# Patient Record
Sex: FEMALE | Race: WHITE | NOT HISPANIC OR LATINO | Employment: OTHER | URBAN - METROPOLITAN AREA
[De-identification: names, ages, dates, MRNs, and addresses within clinical notes are randomized per-mention and may not be internally consistent; named-entity substitution may affect disease eponyms.]

---

## 2018-02-23 ENCOUNTER — HOSPITAL ENCOUNTER (OUTPATIENT)
Dept: LAB | Facility: HOSPITAL | Age: 58
Discharge: HOME | End: 2018-02-23
Attending: INTERNAL MEDICINE | Admitting: INTERNAL MEDICINE

## 2018-02-23 ENCOUNTER — HOSPITAL ENCOUNTER (OUTPATIENT)
Dept: RHEUMATOLOGY | Facility: CLINIC | Age: 58
Discharge: HOME | End: 2018-02-23
Attending: INTERNAL MEDICINE | Admitting: INTERNAL MEDICINE

## 2018-02-23 LAB
ALBUMIN SERPL-MCNC: 3.7 G/DL (ref 3.5–4.8)
ALBUMIN SERPL-MCNC: 3.7 G/DL (ref 3.5–4.8)
ALBUMIN/GLOB SERPL: 1.5 {RATIO} (ref 1–1.7)
ALP SERPL-CCNC: 65 IU/L (ref 32–91)
ALPHA1 GLOB FLD ELPH-MCNC: 0.2 GM/DL (ref 0.1–0.4)
ALPHA2 GLOB SERPL ELPH-MCNC: 0.6 GM/DL (ref 0.5–1)
ALT SERPL-CCNC: 16 IU/L (ref 14–54)
ANION GAP SERPL CALC-SCNC: 10.9 MMOL/L (ref 10–20)
AST SERPL-CCNC: 21 IU/L (ref 15–41)
B-GLOBULIN SERPL ELPH-MCNC: 1.1 GM/DL (ref 0.7–1.4)
BASOPHILS # BLD AUTO: 0.2 10*3/UL (ref 0–0.2)
BASOPHILS NFR BLD AUTO: 2 % (ref 0–2)
BILIRUB SERPL-MCNC: 0.2 MG/DL (ref 0.3–1.2)
BILIRUB UR QL STRIP: NEGATIVE MG/DL
BUN SERPL-MCNC: 8 MG/DL (ref 8–20)
BUN/CREAT SERPL: 11.4 (ref 5.4–26.2)
CALCIUM SERPL-MCNC: 9.2 MG/DL (ref 8.9–10.3)
CASTS URNS QL MICRO: NORMAL /[LPF]
CCP IGG ANTIBODIES: <0.4 U/ML
CHLORIDE SERPL-SCNC: 104 MMOL/L (ref 101–111)
CK SERPL-CCNC: 39 IU/L (ref 38–234)
COLOR UR: YELLOW
CONV BACTERIA IN URINE MICRO: NEGATIVE
CONV CLARITY OF URINE: CLEAR
CONV CO2: 28 MMOL/L (ref 22–32)
CONV HYALINE CASTS IN URINE MICRO: NORMAL /[LPF] (ref 0–5)
CONV PROTEIN IN URINE BY AUTOMATED TEST STRIP: NEGATIVE MG/DL
CONV SMALL ROUND CELLS: NORMAL /[HPF]
CONV TOTAL PROTEIN: 6.2 G/DL (ref 6.1–7.9)
CONV TOTAL PROTEIN: 6.4 G/DL (ref 6.1–7.9)
CONV UROBILINOGEN IN URINE BY AUTOMATED TEST STRIP: 0.2 MG/DL
CREAT UR-MCNC: 0.7 MG/DL (ref 0.4–1)
CRP SERPL-MCNC: 0.19 MG/DL (ref 0–0.7)
CULTURE INDICATED?: NORMAL
DIFFERENTIAL METHOD BLD: (no result)
EOSINOPHIL # BLD AUTO: 0.4 10*3/UL (ref 0–0.3)
EOSINOPHIL # BLD AUTO: 4 % (ref 0–3)
ERYTHROCYTE [DISTWIDTH] IN BLOOD BY AUTOMATED COUNT: 13.3 % (ref 11.5–14.5)
ERYTHROCYTE [SEDIMENTATION RATE] IN BLOOD BY WESTERGREN METHOD: 0 MM/HR (ref 0–30)
GAMMA GLOB SERPL ELPH-MCNC: 0.7 GM/DL (ref 0.6–1.6)
GLOBULIN UR ELPH-MCNC: 2.5 G/DL (ref 2.5–3.8)
GLUCOSE SERPL-MCNC: 100 MG/DL (ref 65–99)
GLUCOSE UR QL: NEGATIVE MG/DL
HAV IGM SERPL QL IA: NONREACTIVE
HBV CORE IGM SERPL QL IA: NONREACTIVE
HBV SURFACE AG SERPL QL IA: NONREACTIVE
HCT VFR BLD AUTO: 43 % (ref 35–49)
HCV AB SER DONR QL: NORMAL
HCV AB SER DONR QL: NORMAL
HGB BLD-MCNC: 14.6 G/DL (ref 12–15)
HGB UR QL STRIP: NEGATIVE
INSULIN SERPL-ACNC: NORMAL U[IU]/ML
KETONES UR QL STRIP: NEGATIVE MG/DL
LEUKOCYTE ESTERASE UR QL STRIP: NEGATIVE
LYMPHOCYTES # BLD AUTO: 2.9 10*3/UL (ref 0.8–4.8)
LYMPHOCYTES NFR BLD AUTO: 30 % (ref 18–42)
MCH RBC QN AUTO: 30.3 PG (ref 26–32)
MCHC RBC AUTO-ENTMCNC: 34 G/DL (ref 32–36)
MCV RBC AUTO: 89.2 FL (ref 80–94)
MONOCYTES # BLD AUTO: 0.6 10*3/UL (ref 0.1–1.3)
MONOCYTES NFR BLD AUTO: 6 % (ref 2–11)
NEUTROPHILS # BLD AUTO: 5.6 10*3/UL (ref 2.3–8.6)
NEUTROPHILS NFR BLD AUTO: 58 % (ref 50–75)
NITRITE UR QL STRIP: NEGATIVE
NRBC BLD AUTO-RTO: 0 /100{WBCS}
NRBC/RBC NFR BLD MANUAL: 0 10*3/UL
PH UR STRIP.AUTO: 5 [PH] (ref 4.5–8)
PLATELET # BLD AUTO: 218 10*3/UL (ref 150–450)
PMV BLD AUTO: 10.5 FL (ref 7.4–10.4)
POTASSIUM SERPL-SCNC: 3.9 MMOL/L (ref 3.6–5.1)
RBC # BLD AUTO: 4.81 10*6/UL (ref 4–5.4)
RBC #/AREA URNS HPF: 2 /[HPF] (ref 0–3)
SODIUM SERPL-SCNC: 139 MMOL/L (ref 136–144)
SP GR UR: 1.01 (ref 1–1.03)
SPERM URNS QL MICRO: NORMAL /[HPF]
SQUAMOUS SPT QL MICRO: 2 /[HPF] (ref 0–5)
UNIDENT CRYS URNS QL MICRO: NORMAL /[HPF]
WBC # BLD AUTO: 9.7 10*3/UL (ref 4.5–11.5)
WBC #/AREA URNS HPF: 1 /[HPF] (ref 0–5)
YEAST SPEC QL WET PREP: NORMAL /[HPF]

## 2018-02-26 LAB
ANA SER QL IA: NORMAL

## 2018-03-01 ENCOUNTER — HOSPITAL ENCOUNTER (OUTPATIENT)
Dept: LAB | Facility: HOSPITAL | Age: 58
Discharge: HOME | End: 2018-03-01
Attending: INTERNAL MEDICINE | Admitting: INTERNAL MEDICINE

## 2018-03-06 LAB — TSPOT INTERPRETATION: NORMAL

## 2018-05-01 ENCOUNTER — HOSPITAL ENCOUNTER (OUTPATIENT)
Dept: MAMMOGRAPHY | Facility: HOSPITAL | Age: 58
Discharge: HOME | End: 2018-05-01
Attending: INTERNAL MEDICINE | Admitting: INTERNAL MEDICINE

## 2018-06-20 ENCOUNTER — HOSPITAL ENCOUNTER (OUTPATIENT)
Dept: GENERAL RADIOLOGY | Facility: HOSPITAL | Age: 58
Discharge: HOME | End: 2018-06-20
Attending: NURSE PRACTITIONER | Admitting: NURSE PRACTITIONER

## 2018-06-20 ENCOUNTER — HOSPITAL ENCOUNTER (OUTPATIENT)
Dept: LAB | Facility: HOSPITAL | Age: 58
Discharge: HOME | End: 2018-06-20
Attending: NURSE PRACTITIONER | Admitting: NURSE PRACTITIONER

## 2018-06-20 LAB
ALBUMIN SERPL-MCNC: 3.7 G/DL (ref 3.5–4.8)
ALBUMIN/GLOB SERPL: 1.4 {RATIO} (ref 1–1.7)
ALP SERPL-CCNC: 78 IU/L (ref 32–91)
ALT SERPL-CCNC: 20 IU/L (ref 14–54)
ANION GAP SERPL CALC-SCNC: 10.1 MMOL/L (ref 10–20)
AST SERPL-CCNC: 25 IU/L (ref 15–41)
BASOPHILS # BLD AUTO: 0.1 10*3/UL (ref 0–0.2)
BASOPHILS NFR BLD AUTO: 1 % (ref 0–2)
BILIRUB SERPL-MCNC: 0.7 MG/DL (ref 0.3–1.2)
BUN SERPL-MCNC: 4 MG/DL (ref 8–20)
BUN/CREAT SERPL: 5.7 (ref 5.4–26.2)
CALCIUM SERPL-MCNC: 8.8 MG/DL (ref 8.9–10.3)
CHLORIDE SERPL-SCNC: 105 MMOL/L (ref 101–111)
CONV CO2: 27 MMOL/L (ref 22–32)
CONV TOTAL PROTEIN: 6.4 G/DL (ref 6.1–7.9)
CREAT UR-MCNC: 0.7 MG/DL (ref 0.4–1)
CRP SERPL-MCNC: 0.27 MG/DL (ref 0–0.7)
DIFFERENTIAL METHOD BLD: (no result)
EOSINOPHIL # BLD AUTO: 0.5 10*3/UL (ref 0–0.3)
EOSINOPHIL # BLD AUTO: 4 % (ref 0–3)
ERYTHROCYTE [DISTWIDTH] IN BLOOD BY AUTOMATED COUNT: 13.6 % (ref 11.5–14.5)
ERYTHROCYTE [SEDIMENTATION RATE] IN BLOOD BY WESTERGREN METHOD: 4 MM/HR (ref 0–30)
GLOBULIN UR ELPH-MCNC: 2.7 G/DL (ref 2.5–3.8)
GLUCOSE SERPL-MCNC: 99 MG/DL (ref 65–99)
HCT VFR BLD AUTO: 43.2 % (ref 35–49)
HGB BLD-MCNC: 14.2 G/DL (ref 12–15)
LYMPHOCYTES # BLD AUTO: 3.4 10*3/UL (ref 0.8–4.8)
LYMPHOCYTES NFR BLD AUTO: 26 % (ref 18–42)
MCH RBC QN AUTO: 30.3 PG (ref 26–32)
MCHC RBC AUTO-ENTMCNC: 32.8 G/DL (ref 32–36)
MCV RBC AUTO: 92.3 FL (ref 80–94)
MONOCYTES # BLD AUTO: 0.9 10*3/UL (ref 0.1–1.3)
MONOCYTES NFR BLD AUTO: 7 % (ref 2–11)
NEUTROPHILS # BLD AUTO: 7.9 10*3/UL (ref 2.3–8.6)
NEUTROPHILS NFR BLD AUTO: 62 % (ref 50–75)
NRBC BLD AUTO-RTO: 0 /100{WBCS}
NRBC/RBC NFR BLD MANUAL: 0 10*3/UL
PLATELET # BLD AUTO: 242 10*3/UL (ref 150–450)
PMV BLD AUTO: 9.9 FL (ref 7.4–10.4)
POTASSIUM SERPL-SCNC: 4.1 MMOL/L (ref 3.6–5.1)
RBC # BLD AUTO: 4.68 10*6/UL (ref 4–5.4)
SODIUM SERPL-SCNC: 138 MMOL/L (ref 136–144)
WBC # BLD AUTO: 12.9 10*3/UL (ref 4.5–11.5)

## 2018-10-01 ENCOUNTER — HOSPITAL ENCOUNTER (OUTPATIENT)
Dept: OTHER | Facility: HOSPITAL | Age: 58
Setting detail: SPECIMEN
Discharge: HOME | End: 2018-10-01
Attending: INTERNAL MEDICINE | Admitting: INTERNAL MEDICINE

## 2019-04-08 ENCOUNTER — HOSPITAL ENCOUNTER (OUTPATIENT)
Dept: LAB | Facility: HOSPITAL | Age: 59
Discharge: HOME | End: 2019-04-08
Attending: INTERNAL MEDICINE | Admitting: INTERNAL MEDICINE

## 2019-04-08 ENCOUNTER — HOSPITAL ENCOUNTER (OUTPATIENT)
Dept: RHEUMATOLOGY | Facility: CLINIC | Age: 59
Discharge: HOME | End: 2019-04-08
Attending: INTERNAL MEDICINE | Admitting: INTERNAL MEDICINE

## 2019-04-08 LAB
ALBUMIN SERPL-MCNC: 3.6 G/DL (ref 3.5–4.8)
ALBUMIN SERPL-MCNC: 3.8 G/DL (ref 3.5–4.8)
ALBUMIN/GLOB SERPL: 1.3 {RATIO} (ref 1–1.7)
ALP SERPL-CCNC: 66 IU/L (ref 32–91)
ALPHA1 GLOB FLD ELPH-MCNC: 0.2 GM/DL (ref 0.1–0.4)
ALPHA2 GLOB SERPL ELPH-MCNC: 0.6 GM/DL (ref 0.5–1)
ALT SERPL-CCNC: 18 IU/L (ref 14–54)
ANION GAP SERPL CALC-SCNC: 11.9 MMOL/L (ref 10–20)
AST SERPL-CCNC: 23 IU/L (ref 15–41)
B-GLOBULIN SERPL ELPH-MCNC: 1 GM/DL (ref 0.7–1.4)
BASOPHILS # BLD AUTO: 0.1 10*3/UL (ref 0–0.2)
BASOPHILS NFR BLD AUTO: 1 % (ref 0–2)
BILIRUB SERPL-MCNC: 0.8 MG/DL (ref 0.3–1.2)
BILIRUB UR QL STRIP: NEGATIVE MG/DL
BUN SERPL-MCNC: 6 MG/DL (ref 8–20)
BUN/CREAT SERPL: 10 (ref 5.4–26.2)
CALCIUM SERPL-MCNC: 9 MG/DL (ref 8.9–10.3)
CASTS URNS QL MICRO: NORMAL /[LPF]
CHLORIDE SERPL-SCNC: 105 MMOL/L (ref 101–111)
COLOR UR: YELLOW
CONV BACTERIA IN URINE MICRO: NEGATIVE
CONV CLARITY OF URINE: CLEAR
CONV CO2: 25 MMOL/L (ref 22–32)
CONV HYALINE CASTS IN URINE MICRO: 1 /[LPF] (ref 0–5)
CONV PROTEIN IN URINE BY AUTOMATED TEST STRIP: NEGATIVE MG/DL
CONV SMALL ROUND CELLS: NORMAL /[HPF]
CONV TOTAL PROTEIN: 6.6 G/DL (ref 6.1–7.9)
CONV TOTAL PROTEIN: 6.8 G/DL (ref 6.1–7.9)
CONV UROBILINOGEN IN URINE BY AUTOMATED TEST STRIP: 0.2 MG/DL
CREAT UR-MCNC: 0.6 MG/DL (ref 0.4–1)
CRP SERPL-MCNC: 0.23 MG/DL (ref 0–0.7)
CULTURE INDICATED?: NORMAL
DIFFERENTIAL METHOD BLD: (no result)
EOSINOPHIL # BLD AUTO: 0.3 10*3/UL (ref 0–0.3)
EOSINOPHIL # BLD AUTO: 3 % (ref 0–3)
ERYTHROCYTE [DISTWIDTH] IN BLOOD BY AUTOMATED COUNT: 13.4 % (ref 11.5–14.5)
ERYTHROCYTE [SEDIMENTATION RATE] IN BLOOD BY WESTERGREN METHOD: 3 MM/HR (ref 0–30)
GAMMA GLOB SERPL ELPH-MCNC: 1.2 GM/DL (ref 0.6–1.6)
GLOBULIN UR ELPH-MCNC: 3 G/DL (ref 2.5–3.8)
GLUCOSE SERPL-MCNC: 94 MG/DL (ref 65–99)
GLUCOSE UR QL: NEGATIVE MG/DL
HCT VFR BLD AUTO: 42.3 % (ref 35–49)
HGB BLD-MCNC: 13.8 G/DL (ref 12–15)
HGB UR QL STRIP: NEGATIVE
INSULIN SERPL-ACNC: NORMAL U[IU]/ML
KETONES UR QL STRIP: NEGATIVE MG/DL
LEUKOCYTE ESTERASE UR QL STRIP: NEGATIVE
LYMPHOCYTES # BLD AUTO: 2.6 10*3/UL (ref 0.8–4.8)
LYMPHOCYTES NFR BLD AUTO: 24 % (ref 18–42)
MCH RBC QN AUTO: 31.4 PG (ref 26–32)
MCHC RBC AUTO-ENTMCNC: 32.7 G/DL (ref 32–36)
MCV RBC AUTO: 95.9 FL (ref 80–94)
MONOCYTES # BLD AUTO: 0.8 10*3/UL (ref 0.1–1.3)
MONOCYTES NFR BLD AUTO: 7 % (ref 2–11)
NEUTROPHILS # BLD AUTO: 6.8 10*3/UL (ref 2.3–8.6)
NEUTROPHILS NFR BLD AUTO: 65 % (ref 50–75)
NITRITE UR QL STRIP: NEGATIVE
NRBC BLD AUTO-RTO: 0 /100{WBCS}
NRBC/RBC NFR BLD MANUAL: 0 10*3/UL
PH UR STRIP.AUTO: 6 [PH] (ref 4.5–8)
PLATELET # BLD AUTO: 207 10*3/UL (ref 150–450)
PMV BLD AUTO: 10.2 FL (ref 7.4–10.4)
POTASSIUM SERPL-SCNC: 3.9 MMOL/L (ref 3.6–5.1)
RBC # BLD AUTO: 4.41 10*6/UL (ref 4–5.4)
RBC #/AREA URNS HPF: 1 /[HPF] (ref 0–3)
SODIUM SERPL-SCNC: 138 MMOL/L (ref 136–144)
SP GR UR: 1.01 (ref 1–1.03)
SPERM URNS QL MICRO: NORMAL /[HPF]
SQUAMOUS SPT QL MICRO: 1 /[HPF] (ref 0–5)
UNIDENT CRYS URNS QL MICRO: NORMAL /[HPF]
WBC # BLD AUTO: 10.5 10*3/UL (ref 4.5–11.5)
WBC #/AREA URNS HPF: 0 /[HPF] (ref 0–5)
YEAST SPEC QL WET PREP: NORMAL /[HPF]

## 2019-07-12 ENCOUNTER — OFFICE VISIT (OUTPATIENT)
Dept: RHEUMATOLOGY | Facility: CLINIC | Age: 59
End: 2019-07-12

## 2019-07-12 VITALS
HEART RATE: 63 BPM | SYSTOLIC BLOOD PRESSURE: 166 MMHG | WEIGHT: 191 LBS | BODY MASS INDEX: 29.98 KG/M2 | HEIGHT: 67 IN | DIASTOLIC BLOOD PRESSURE: 70 MMHG

## 2019-07-12 DIAGNOSIS — Z79.899 OTHER LONG TERM (CURRENT) DRUG THERAPY: ICD-10-CM

## 2019-07-12 DIAGNOSIS — M79.7 FIBROMYALGIA: Primary | ICD-10-CM

## 2019-07-12 DIAGNOSIS — L40.9 PSORIASIS: ICD-10-CM

## 2019-07-12 DIAGNOSIS — M19.90 INFLAMMATORY ARTHRITIS: ICD-10-CM

## 2019-07-12 DIAGNOSIS — M54.50 CHRONIC BILATERAL LOW BACK PAIN WITHOUT SCIATICA: ICD-10-CM

## 2019-07-12 DIAGNOSIS — G89.29 CHRONIC BILATERAL LOW BACK PAIN WITHOUT SCIATICA: ICD-10-CM

## 2019-07-12 DIAGNOSIS — M85.80 OSTEOPENIA WITH HIGH RISK OF FRACTURE: ICD-10-CM

## 2019-07-12 PROBLEM — K51.90 MILD CHRONIC ULCERATIVE COLITIS (HCC): Status: ACTIVE | Noted: 2018-02-23

## 2019-07-12 PROBLEM — G56.01 CARPAL TUNNEL SYNDROME, RIGHT: Status: ACTIVE | Noted: 2018-02-23

## 2019-07-12 PROCEDURE — 99214 OFFICE O/P EST MOD 30 MIN: CPT | Performed by: INTERNAL MEDICINE

## 2019-07-12 RX ORDER — LANOLIN ALCOHOL/MO/W.PET/CERES
1 CREAM (GRAM) TOPICAL DAILY
COMMUNITY
Start: 2018-02-23

## 2019-07-12 RX ORDER — DEXLANSOPRAZOLE 60 MG/1
1 CAPSULE, DELAYED RELEASE ORAL DAILY
Refills: 0 | COMMUNITY
Start: 2019-07-02

## 2019-07-12 RX ORDER — SULFASALAZINE 500 MG/1
3 TABLET ORAL 2 TIMES DAILY
Refills: 0 | COMMUNITY
Start: 2019-05-18

## 2019-07-12 RX ORDER — CYCLOBENZAPRINE HCL 10 MG
1 TABLET ORAL 2 TIMES DAILY
COMMUNITY
Start: 2018-06-20

## 2019-07-12 RX ORDER — INFLIXIMAB 100 MG/10ML
INJECTION, POWDER, LYOPHILIZED, FOR SOLUTION INTRAVENOUS
COMMUNITY
Start: 2018-09-20

## 2019-07-12 RX ORDER — HYDROCODONE BITARTRATE AND ACETAMINOPHEN 10; 325 MG/1; MG/1
1 TABLET ORAL EVERY 6 HOURS PRN
Refills: 0 | COMMUNITY
Start: 2019-06-19

## 2019-07-12 RX ORDER — GABAPENTIN 300 MG/1
1 CAPSULE ORAL 3 TIMES DAILY
COMMUNITY
Start: 2017-06-26

## 2019-07-12 RX ORDER — ESTRADIOL 1 MG/1
1 TABLET ORAL DAILY
Refills: 0 | COMMUNITY
Start: 2019-04-27

## 2019-07-12 RX ORDER — HYDROXYZINE 50 MG/1
1 TABLET, FILM COATED ORAL EVERY 6 HOURS PRN
Refills: 0 | COMMUNITY
Start: 2019-05-20

## 2019-07-12 RX ORDER — MULTIVIT WITH MINERALS/LUTEIN
1 TABLET ORAL DAILY
COMMUNITY
Start: 2018-02-23

## 2019-07-12 NOTE — PATIENT INSTRUCTIONS
Fall Prevention in the Home, Adult  Falls can cause injuries. They can happen to people of all ages. There are many things you can do to make your home safe and to help prevent falls. Ask for help when making these changes, if needed.  What actions can I take to prevent falls?  General Instructions  · Use good lighting in all rooms. Replace any light bulbs that burn out.  · Turn on the lights when you go into a dark area. Use night-lights.  · Keep items that you use often in easy-to-reach places. Lower the shelves around your home if necessary.  · Set up your furniture so you have a clear path. Avoid moving your furniture around.  · Do not have throw rugs and other things on the floor that can make you trip.  · Avoid walking on wet floors.  · If any of your floors are uneven, fix them.  · Add color or contrast paint or tape to clearly denilson and help you see:  ? Any grab bars or handrails.  ? First and last steps of stairways.  ? Where the edge of each step is.  · If you use a stepladder:  ? Make sure that it is fully opened. Do not climb a closed stepladder.  ? Make sure that both sides of the stepladder are locked into place.  ? Ask someone to hold the stepladder for you while you use it.  · If there are any pets around you, be aware of where they are.  What can I do in the bathroom?  · Keep the floor dry. Clean up any water that spills onto the floor as soon as it happens.  · Remove soap buildup in the tub or shower regularly.  · Use non-skid mats or decals on the floor of the tub or shower.  · Attach bath mats securely with double-sided, non-slip rug tape.  · If you need to sit down in the shower, use a plastic, non-slip stool.  · Install grab bars by the toilet and in the tub and shower. Do not use towel bars as grab bars.  What can I do in the bedroom?  · Make sure that you have a light by your bed that is easy to reach.  · Do not use any sheets or blankets that are too big for your bed. They should not hang  down onto the floor.  · Have a firm chair that has side arms. You can use this for support while you get dressed.  What can I do in the kitchen?  · Clean up any spills right away.  · If you need to reach something above you, use a strong step stool that has a grab bar.  · Keep electrical cords out of the way.  · Do not use floor polish or wax that makes floors slippery. If you must use wax, use non-skid floor wax.  What can I do with my stairs?  · Do not leave any items on the stairs.  · Make sure that you have a light switch at the top of the stairs and the bottom of the stairs. If you do not have them, ask someone to add them for you.  · Make sure that there are handrails on both sides of the stairs, and use them. Fix handrails that are broken or loose. Make sure that handrails are as long as the stairways.  · Install non-slip stair treads on all stairs in your home.  · Avoid having throw rugs at the top or bottom of the stairs. If you do have throw rugs, attach them to the floor with carpet tape.  · Choose a carpet that does not hide the edge of the steps on the stairway.  · Check any carpeting to make sure that it is firmly attached to the stairs. Fix any carpet that is loose or worn.  What can I do on the outside of my home?  · Use bright outdoor lighting.  · Regularly fix the edges of walkways and driveways and fix any cracks.  · Remove anything that might make you trip as you walk through a door, such as a raised step or threshold.  · Trim any bushes or trees on the path to your home.  · Regularly check to see if handrails are loose or broken. Make sure that both sides of any steps have handrails.  · Install guardrails along the edges of any raised decks and porches.  · Clear walking paths of anything that might make someone trip, such as tools or rocks.  · Have any leaves, snow, or ice cleared regularly.  · Use sand or salt on walking paths during winter.  · Clean up any spills in your garage right away.  This includes grease or oil spills.  What other actions can I take?  · Wear shoes that:  ? Have a low heel. Do not wear high heels.  ? Have rubber bottoms.  ? Are comfortable and fit you well.  ? Are closed at the toe. Do not wear open-toe sandals.  · Use tools that help you move around (mobility aids) if they are needed. These include:  ? Canes.  ? Walkers.  ? Scooters.  ? Crutches.  · Review your medicines with your doctor. Some medicines can make you feel dizzy. This can increase your chance of falling.  Ask your doctor what other things you can do to help prevent falls.  Where to find more information  · Centers for Disease Control and Prevention, [place]: https://cdc.gov  · National [place] on Aging: https://hb4hdoc.marielos.nih.gov  Contact a doctor if:  · You are afraid of falling at home.  · You feel weak, drowsy, or dizzy at home.  · You fall at home.  Summary  · There are many simple things that you can do to make your home safe and to help prevent falls.  · Ways to make your home safe include removing tripping hazards and installing grab bars in the bathroom.  · Ask for help when making these changes in your home.  This information is not intended to replace advice given to you by your health care provider. Make sure you discuss any questions you have with your health care provider.  Document Released: 10/14/2010 Document Revised: 08/02/2018 Document Reviewed: 08/02/2018  [employer] Interactive Patient Education © 2019 [employer]

## 2019-07-12 NOTE — PROGRESS NOTES
Subjective   Chief Complaint   Patient presents with   • Follow-up   • Joint Pain     Joint Pain all over        Raina Pratt is a 59 y.o. female.     History of Present Illness    Is a 59 years old well-built has not white female with history of the ulcerative colitis and psoriasis inflammatory arthritis fibromyalgia back pains and osteoarthritis of the hands.  She currently take sulfasalazine 3 tablets twice a day and also she is getting Remicade infusion by GI doctors for her ulcerative colitis every 8 weeks which she claimed that obtained recently was helping her psoriasis to what in the past few weeks her psoriasis is getting worse especially in her legs.  No fever no chills no temporal headache jaw pains she had a blood work in June 18.  She also due to get her Prolia injections would review her blood works and we okay for her to come back and get the Prolia injections.  No temporal headaches jaw pains no fever chills no significant numbness or tingling in her hands or significant dryness in her eyes.  Lower back is bothering her but there is no significant radiation to the legs.  She has not noticed any significant swelling in any joints she is still have abdominal pain and have loose stool after each time that she eats.    The following portions of the patient's history were reviewed and updated as appropriate: allergies, current medications, past family history, past medical history, past social history, past surgical history and problem list.    Past Medical History:   Diagnosis Date   • Allergic rhinitis    • Bronchitis    • Fibroadenoma 1980    LEFT   • Laryngitis    • Low back pain radiating to left leg    • Neurodermatitis    • Ulcerative colitis (CMS/HCC)       Past Surgical History:   Procedure Laterality Date   • HYSTERECTOMY       Family History   Problem Relation Age of Onset   • Arthritis Mother    • Osteopenia Mother       Social History     Socioeconomic History   • Marital status: Single      Spouse name: Not on file   • Number of children: 2   • Years of education: Not on file   • Highest education level: Not on file   Tobacco Use   • Smoking status: Current Every Day Smoker     Packs/day: 1.00     Types: Cigarettes   • Smokeless tobacco: Never Used   Substance and Sexual Activity   • Alcohol use: Yes     Frequency: Monthly or less     Comment: social     No Known Allergies     Current Outpatient Medications:   •  ascorbic acid (VITAMIN C) 1000 MG tablet, Take 1 tablet by mouth Daily., Disp: , Rfl:   •  cyclobenzaprine (FLEXERIL) 10 MG tablet, Take 1 tablet by mouth 2 (Two) Times a Day., Disp: , Rfl:   •  DEXILANT 60 MG capsule, Take 1 capsule by mouth Daily., Disp: , Rfl: 0  •  estradiol (ESTRACE) 1 MG tablet, Take 1 mg by mouth Daily., Disp: , Rfl: 0  •  gabapentin (NEURONTIN) 300 MG capsule, Take 1 capsule by mouth 3 (Three) Times a Day., Disp: , Rfl:   •  HYDROcodone-acetaminophen (NORCO)  MG per tablet, Take 1 tablet by mouth Every 6 (Six) Hours As Needed., Disp: , Rfl: 0  •  hydrOXYzine (ATARAX) 50 MG tablet, Take 1 tablet by mouth Every 6 (Six) Hours As Needed., Disp: , Rfl: 0  •  inFLIXimab (REMICADE) 100 MG injection, REMICADE 100 MG SOLR, Disp: , Rfl:   •  sertraline (ZOLOFT) 50 MG tablet, Take 50 mg by mouth Daily., Disp: , Rfl: 0  •  sulfaSALAzine (AZULFIDINE) 500 MG tablet, Take 3 tablets by mouth 2 (Two) Times a Day., Disp: , Rfl: 0  •  vitamin B-12 (CYANOCOBALAMIN) 1000 MCG tablet, Take 1 tablet by mouth Daily., Disp: , Rfl:      Review of System    Appetite is okay but she had looser stool after each time she eats.  No significant respiratory problem no significant ears nose throat problem she has a skin problem with his psoriasis is getting worse.  She also have off-and-on weakness.  But not significant.    Objective   Physical Exam    She is well-built pleasant white female alert orientated cooperative does not appear in significant distress her gait and postures are fairly  normal.  HEENT is unremarkable.  Chest is clear to auscultation percussion.  Cardiovascular S1 and S2.  Abdomen: There is tenderness over epigastric area in the lower quadrant without any rebound tenderness and bowel sounds are present.  Neurologic exam: Deep tendon reflexes are symmetrical normal upper and lower extremities straight leg raises are normal toe flexor and extensor are normal peripheral pulses are normal.  Joint exam: Range of motion of the joints are fairly normal.  Joint tenderness there is tenderness over the lumbar spine mild tenderness over the shoulders couple of metacarpal phalanges and PIP joints.  Joint swelling there is no significant swelling in any joints.  Muscle exam she has a slight decrease of the  and mild general muscle weakness.  His skin she has psoriasis plaque over the lateral aspect of the right lower leg.  And lesser on the left side.    Assessment/Plan   Problem List Items Addressed This Visit        Nervous and Auditory    Fibromyalgia - Primary    Lower back pain       Musculoskeletal and Integument    Inflammatory arthritis    Psoriasis    Osteopenia with high risk of fracture       Other    Other long term (current) drug therapy          I suggested continue with the current medications but if she gets another Remicade and is still have psoriasis and GI problem advisable discussed with him to change the medication and continue with the sulfasalazine and take vitamin D at least 2000 of the D3 daily.  Talk about joint protection fall prevention and she will return to get the Prolia injections I will see her again in another 3 months.  Patient Instructions   Fall Prevention in the Home, Adult  Falls can cause injuries. They can happen to people of all ages. There are many things you can do to make your home safe and to help prevent falls. Ask for help when making these changes, if needed.  What actions can I take to prevent falls?  General Instructions  · Use good lighting  in all rooms. Replace any light bulbs that burn out.  · Turn on the lights when you go into a dark area. Use night-lights.  · Keep items that you use often in easy-to-reach places. Lower the shelves around your home if necessary.  · Set up your furniture so you have a clear path. Avoid moving your furniture around.  · Do not have throw rugs and other things on the floor that can make you trip.  · Avoid walking on wet floors.  · If any of your floors are uneven, fix them.  · Add color or contrast paint or tape to clearly denilson and help you see:  ? Any grab bars or handrails.  ? First and last steps of stairways.  ? Where the edge of each step is.  · If you use a stepladder:  ? Make sure that it is fully opened. Do not climb a closed stepladder.  ? Make sure that both sides of the stepladder are locked into place.  ? Ask someone to hold the stepladder for you while you use it.  · If there are any pets around you, be aware of where they are.  What can I do in the bathroom?  · Keep the floor dry. Clean up any water that spills onto the floor as soon as it happens.  · Remove soap buildup in the tub or shower regularly.  · Use non-skid mats or decals on the floor of the tub or shower.  · Attach bath mats securely with double-sided, non-slip rug tape.  · If you need to sit down in the shower, use a plastic, non-slip stool.  · Install grab bars by the toilet and in the tub and shower. Do not use towel bars as grab bars.  What can I do in the bedroom?  · Make sure that you have a light by your bed that is easy to reach.  · Do not use any sheets or blankets that are too big for your bed. They should not hang down onto the floor.  · Have a firm chair that has side arms. You can use this for support while you get dressed.  What can I do in the kitchen?  · Clean up any spills right away.  · If you need to reach something above you, use a strong step stool that has a grab bar.  · Keep electrical cords out of the way.  · Do not use  floor polish or wax that makes floors slippery. If you must use wax, use non-skid floor wax.  What can I do with my stairs?  · Do not leave any items on the stairs.  · Make sure that you have a light switch at the top of the stairs and the bottom of the stairs. If you do not have them, ask someone to add them for you.  · Make sure that there are handrails on both sides of the stairs, and use them. Fix handrails that are broken or loose. Make sure that handrails are as long as the stairways.  · Install non-slip stair treads on all stairs in your home.  · Avoid having throw rugs at the top or bottom of the stairs. If you do have throw rugs, attach them to the floor with carpet tape.  · Choose a carpet that does not hide the edge of the steps on the stairway.  · Check any carpeting to make sure that it is firmly attached to the stairs. Fix any carpet that is loose or worn.  What can I do on the outside of my home?  · Use bright outdoor lighting.  · Regularly fix the edges of walkways and driveways and fix any cracks.  · Remove anything that might make you trip as you walk through a door, such as a raised step or threshold.  · Trim any bushes or trees on the path to your home.  · Regularly check to see if handrails are loose or broken. Make sure that both sides of any steps have handrails.  · Install guardrails along the edges of any raised decks and porches.  · Clear walking paths of anything that might make someone trip, such as tools or rocks.  · Have any leaves, snow, or ice cleared regularly.  · Use sand or salt on walking paths during winter.  · Clean up any spills in your garage right away. This includes grease or oil spills.  What other actions can I take?  · Wear shoes that:  ? Have a low heel. Do not wear high heels.  ? Have rubber bottoms.  ? Are comfortable and fit you well.  ? Are closed at the toe. Do not wear open-toe sandals.  · Use tools that help you move around (mobility aids) if they are needed.  These include:  ? Canes.  ? Walkers.  ? Scooters.  ? Crutches.  · Review your medicines with your doctor. Some medicines can make you feel dizzy. This can increase your chance of falling.  Ask your doctor what other things you can do to help prevent falls.  Where to find more information  · Centers for Disease Control and Prevention, [place]: https://cdc.gov  · National [place] on Aging: https://mg6eqot.marielos.nih.gov  Contact a doctor if:  · You are afraid of falling at home.  · You feel weak, drowsy, or dizzy at home.  · You fall at home.  Summary  · There are many simple things that you can do to make your home safe and to help prevent falls.  · Ways to make your home safe include removing tripping hazards and installing grab bars in the bathroom.  · Ask for help when making these changes in your home.  This information is not intended to replace advice given to you by your health care provider. Make sure you discuss any questions you have with your health care provider.  Document Released: 10/14/2010 Document Revised: 08/02/2018 Document Reviewed: 08/02/2018  [employer] Interactive Patient Education © 2019 [employer]

## 2019-09-26 PROCEDURE — 88305 TISSUE EXAM BY PATHOLOGIST: CPT | Performed by: INTERNAL MEDICINE

## 2019-09-27 ENCOUNTER — LAB REQUISITION (OUTPATIENT)
Dept: LAB | Facility: HOSPITAL | Age: 59
End: 2019-09-27

## 2019-09-27 DIAGNOSIS — R10.13 EPIGASTRIC PAIN: ICD-10-CM

## 2019-09-30 LAB
LAB AP CASE REPORT: NORMAL
PATH REPORT.FINAL DX SPEC: NORMAL
PATH REPORT.GROSS SPEC: NORMAL